# Patient Record
(demographics unavailable — no encounter records)

---

## 2021-02-01 NOTE — NUR
Patient given written and verbal discharge instructions.  Patient verbalizes 
understanding of instructions.  Patient is ambulatory with steady gait.  
Refuses offer of shelter placement.  Patient given list of available shelters 
in surrounding area.pt walks in steady gait, a pair of shoes and food provided 
per pt request.